# Patient Record
Sex: FEMALE
[De-identification: names, ages, dates, MRNs, and addresses within clinical notes are randomized per-mention and may not be internally consistent; named-entity substitution may affect disease eponyms.]

---

## 2017-12-04 ENCOUNTER — APPOINTMENT (OUTPATIENT)
Dept: OBGYN | Facility: CLINIC | Age: 52
End: 2017-12-04
Payer: COMMERCIAL

## 2017-12-04 VITALS
WEIGHT: 172 LBS | HEIGHT: 67 IN | SYSTOLIC BLOOD PRESSURE: 126 MMHG | BODY MASS INDEX: 27 KG/M2 | DIASTOLIC BLOOD PRESSURE: 76 MMHG

## 2017-12-04 DIAGNOSIS — F17.200 NICOTINE DEPENDENCE, UNSPECIFIED, UNCOMPLICATED: ICD-10-CM

## 2017-12-04 DIAGNOSIS — Z82.49 FAMILY HISTORY OF ISCHEMIC HEART DISEASE AND OTHER DISEASES OF THE CIRCULATORY SYSTEM: ICD-10-CM

## 2017-12-04 LAB
BILIRUB UR QL STRIP: NORMAL
COLLECTION METHOD: NORMAL
GLUCOSE UR-MCNC: NORMAL
HCG UR QL: 0.2 EU/DL
HEMOCCULT SP1 STL QL: NEGATIVE
HGB UR QL STRIP.AUTO: NORMAL
KETONES UR-MCNC: NORMAL
LEUKOCYTE ESTERASE UR QL STRIP: NORMAL
NITRITE UR QL STRIP: NORMAL
PH UR STRIP: 6
PROT UR STRIP-MCNC: NORMAL
SP GR UR STRIP: <=1.005

## 2017-12-04 PROCEDURE — 81003 URINALYSIS AUTO W/O SCOPE: CPT | Mod: QW

## 2017-12-04 PROCEDURE — 99396 PREV VISIT EST AGE 40-64: CPT

## 2017-12-04 PROCEDURE — 82270 OCCULT BLOOD FECES: CPT

## 2018-02-28 LAB — HPV HIGH+LOW RISK DNA PNL CVX: NOT DETECTED

## 2019-02-25 ENCOUNTER — APPOINTMENT (OUTPATIENT)
Dept: OBGYN | Facility: CLINIC | Age: 54
End: 2019-02-25

## 2019-04-25 ENCOUNTER — APPOINTMENT (OUTPATIENT)
Dept: OBGYN | Facility: CLINIC | Age: 54
End: 2019-04-25
Payer: COMMERCIAL

## 2019-04-25 VITALS
DIASTOLIC BLOOD PRESSURE: 74 MMHG | WEIGHT: 173 LBS | HEIGHT: 67 IN | BODY MASS INDEX: 27.15 KG/M2 | SYSTOLIC BLOOD PRESSURE: 126 MMHG

## 2019-04-25 DIAGNOSIS — Z12.11 ENCOUNTER FOR SCREENING FOR MALIGNANT NEOPLASM OF COLON: ICD-10-CM

## 2019-04-25 LAB — HEMOCCULT SP1 STL QL: NEGATIVE

## 2019-04-25 PROCEDURE — 99396 PREV VISIT EST AGE 40-64: CPT

## 2019-04-25 PROCEDURE — 82270 OCCULT BLOOD FECES: CPT

## 2019-04-25 RX ORDER — PRASTERONE 6.5 MG/1
6.5 INSERT VAGINAL
Qty: 90 | Refills: 3 | Status: COMPLETED | COMMUNITY
Start: 2017-12-04 | End: 2019-04-25

## 2019-04-25 NOTE — REVIEW OF SYSTEMS
[Incontinence] : incontinence [Sleep Disturbances] : sleep disturbances [Frequency] : frequency [Nl] : Integumentary [FreeTextEntry1] : STRESS INCONTINENCE

## 2019-04-25 NOTE — PHYSICAL EXAM
[Awake] : awake [Alert] : alert [Goiter] : goiter [Soft] : soft [Oriented x3] : oriented to person, place, and time [Normal] : uterus [Cystocele] : a cystocele [Rectocele] : a rectocele [No Bleeding] : there was no active vaginal bleeding [No Tenderness] : no rectal tenderness [Uterine Adnexae] : were not tender and not enlarged [CTAB] : CTAB [RRR, No Murmurs] : RRR, no murmurs [Acute Distress] : no acute distress [LAD] : no lymphadenopathy [Thyroid Nodule] : no thyroid nodule [Mass] : no breast mass [Nipple Discharge] : no nipple discharge [Axillary LAD] : no axillary lymphadenopathy [Tender] : non tender [Discharge] : no discharge

## 2019-04-25 NOTE — HISTORY OF PRESENT ILLNESS
[___ Year(s) Ago] : [unfilled] year(s) ago [Good] : being in good health [Healthy Diet] : a healthy diet [Regular Exercise] : regular exercise [Weight Concerns] : weight concens [___ Servings of Dairy/Day] : [unfilled] servings of dairy foods per day [3 or more times/wk] :  3 or more times per week [Last Mammogram ___] : Last Mammogram was [unfilled] [Annual Vaginal Sonography ___] : annual vaginal sonograph [unfilled] [Performed Within 5 Years] : lipid profile performed within the past five years [Performed Last Year] : glucose screening performed last year [Uncertain Timing] : uncertain timing of ~his/her~ last thyroid function test [Hypertension] : hypertension [High LDL] : high LDL cholesterol [Low HDL] : low HDL cholesterol [Tobacco Use] : tobacco use [FH Cardiovascular Disease] : family history of cardiovascular disease [Abnormal Cervical Cytology] : abnormal cervical cytology [Pregnancy History] : pregnancy history: [Total Preg ___] : [unfilled] [Full Term ___] : [unfilled] [AB Spont ___] : miscarriages: [unfilled] [HPV Vaccine NA Due to Age] : HPV vaccine not available to patient due to age [Hot Flashes] : hot flashes [Night Sweats] : night sweats [Dyspareunia] : dyspareunia [Definite:  ___ (Date)] : the last menstrual period was [unfilled] [Experiencing Menopausal Sxs] : experiencing menopausal symptoms [Monogamous] : is monogamous [Male ___] : [unfilled] male [Current] : metabolic screening reviewed and current [Last Pap ___] : Last cervical pap smear was [unfilled] [Last Bone Density ___] : Last bone density studies [unfilled] [Last Colonoscopy ___] : Last colonoscopy [unfilled] [Postmenopausal] : is postmenopausal [Sexually Active] : is sexually active [Monogamous (Male Partner)] : is monogamous with a male partner [Diabetes] : no diabetes [Stress] : no stress [Obesity] : no obesity [Illicit Drug Use] : no illicit drug use [Sedentary Lifestyle] : no sedentary lifestyle [Previous Breast Cancer] : no previous breast cancer [In Utero MARY ANN Exposure] : no diethylstilbestrol (MARY ANN) exposure in utero [HPV Positive] : no positive screening for human papilloma virus [Previous Colon Polyps] : no previous colon polyps [Inflammatory Bowel Disease] : no inflammatory bowel disease [de-identified] : work as exercise, walking  does 4-5 miles daily [de-identified] : July 2017 [Currently In Menopause] : not currently in menopause

## 2019-04-25 NOTE — COUNSELING
[Breast Self Exam] : breast self exam [Nutrition] : nutrition [Exercise] : exercise [Vitamins/Supplements] : vitamins/supplements [Smoking Cessation] : smoking cessation [FreeTextEntry2] : Encouraged pt to consume 1200 mg. of calcium daily, in foods, or in supplements.  If using supplements, correct use of same reviewed.  Advised pt to get her Vit D level checked, and to take at least 1000 iu of Vit D3 daily in the interim;  discussed importance of weight bearing and resistance exercise,  5 times weekly for 30 minutes, and  finally safety was discussed .  Call fro any postmenop bleeding

## 2019-04-29 LAB — HPV HIGH+LOW RISK DNA PNL CVX: NOT DETECTED

## 2019-05-01 LAB — CYTOLOGY CVX/VAG DOC THIN PREP: NORMAL

## 2021-04-09 ENCOUNTER — APPOINTMENT (OUTPATIENT)
Dept: OBGYN | Facility: CLINIC | Age: 56
End: 2021-04-09

## 2021-11-17 ENCOUNTER — APPOINTMENT (OUTPATIENT)
Dept: OBGYN | Facility: CLINIC | Age: 56
End: 2021-11-17
Payer: COMMERCIAL

## 2021-11-17 ENCOUNTER — NON-APPOINTMENT (OUTPATIENT)
Age: 56
End: 2021-11-17

## 2021-11-17 VITALS
SYSTOLIC BLOOD PRESSURE: 142 MMHG | DIASTOLIC BLOOD PRESSURE: 68 MMHG | HEIGHT: 67 IN | BODY MASS INDEX: 27.94 KG/M2 | WEIGHT: 178 LBS

## 2021-11-17 DIAGNOSIS — Z12.39 ENCOUNTER FOR OTHER SCREENING FOR MALIGNANT NEOPLASM OF BREAST: ICD-10-CM

## 2021-11-17 DIAGNOSIS — F17.210 NICOTINE DEPENDENCE, CIGARETTES, UNCOMPLICATED: ICD-10-CM

## 2021-11-17 DIAGNOSIS — R87.610 ATYPICAL SQUAMOUS CELLS OF UNDETERMINED SIGNIFICANCE ON CYTOLOGIC SMEAR OF CERVIX (ASC-US): ICD-10-CM

## 2021-11-17 DIAGNOSIS — N95.2 POSTMENOPAUSAL ATROPHIC VAGINITIS: ICD-10-CM

## 2021-11-17 DIAGNOSIS — Z12.11 ENCOUNTER FOR SCREENING FOR MALIGNANT NEOPLASM OF COLON: ICD-10-CM

## 2021-11-17 DIAGNOSIS — Z13.820 ENCOUNTER FOR SCREENING FOR OSTEOPOROSIS: ICD-10-CM

## 2021-11-17 DIAGNOSIS — R87.810 CERVICAL HIGH RISK HUMAN PAPILLOMAVIRUS (HPV) DNA TEST POSITIVE: ICD-10-CM

## 2021-11-17 PROCEDURE — 99396 PREV VISIT EST AGE 40-64: CPT

## 2021-11-17 NOTE — PHYSICAL EXAM
[Appropriately responsive] : appropriately responsive [Alert] : alert [No Acute Distress] : no acute distress [No Murmurs] : no murmurs [Soft] : soft [Non-tender] : non-tender [Non-distended] : non-distended [No HSM] : No HSM [No Lesions] : no lesions [No Mass] : no mass [Oriented x3] : oriented x3 [Examination Of The Breasts] : a normal appearance [No Masses] : no breast masses were palpable [Vulvar Atrophy] : vulvar atrophy [Labia Majora] : normal [Labia Minora] : normal [Normal] : normal [Uterine Adnexae] : normal [Normal rectal exam] : was normal [Occult Blood Positive] : was negative for occult blood analysis

## 2021-11-17 NOTE — HISTORY OF PRESENT ILLNESS
[Patient reported mammogram was normal] : Patient reported mammogram was normal [Patient reported PAP Smear was normal] : Patient reported PAP Smear was normal [FreeTextEntry1] : 56-year-old female presents to office for annual exam without gynecological complaints. PAP smear completed. Bone density, mammogram, and low dose chest CT prescriptions given. Discussed possible Cologuard for colon cancer screening pending insurance. Smoking cessation education provided and discussed with patient. \par  [Mammogramdate] : 09/2020 [PapSmeardate] : 04/2019 [Currently Active] : currently active [Men] : men [No] : No

## 2021-11-17 NOTE — PLAN
[FreeTextEntry1] : \par THE PHYSICAL EXAM PORTION OF THE VISIT WAS CHAPERONED BY\par SOLANGE SOTELO RN\par

## 2021-11-22 LAB
CYTOLOGY CVX/VAG DOC THIN PREP: ABNORMAL
HPV HIGH+LOW RISK DNA PNL CVX: NOT DETECTED

## 2022-11-23 ENCOUNTER — APPOINTMENT (OUTPATIENT)
Dept: OBGYN | Facility: CLINIC | Age: 57
End: 2022-11-23

## 2023-04-07 ENCOUNTER — NON-APPOINTMENT (OUTPATIENT)
Age: 58
End: 2023-04-07

## 2023-07-13 ENCOUNTER — APPOINTMENT (OUTPATIENT)
Dept: OBGYN | Facility: CLINIC | Age: 58
End: 2023-07-13
Payer: COMMERCIAL

## 2023-07-13 ENCOUNTER — NON-APPOINTMENT (OUTPATIENT)
Age: 58
End: 2023-07-13

## 2023-07-13 VITALS
WEIGHT: 194.6 LBS | BODY MASS INDEX: 30.54 KG/M2 | HEIGHT: 67 IN | SYSTOLIC BLOOD PRESSURE: 116 MMHG | DIASTOLIC BLOOD PRESSURE: 70 MMHG

## 2023-07-13 DIAGNOSIS — Z12.4 ENCOUNTER FOR SCREENING FOR MALIGNANT NEOPLASM OF CERVIX: ICD-10-CM

## 2023-07-13 DIAGNOSIS — Z87.39 PERSONAL HISTORY OF OTHER DISEASES OF THE MUSCULOSKELETAL SYSTEM AND CONNECTIVE TISSUE: ICD-10-CM

## 2023-07-13 DIAGNOSIS — Z01.419 ENCOUNTER FOR GYNECOLOGICAL EXAMINATION (GENERAL) (ROUTINE) W/OUT ABNORMAL FINDINGS: ICD-10-CM

## 2023-07-13 PROCEDURE — 99396 PREV VISIT EST AGE 40-64: CPT

## 2023-07-13 NOTE — HISTORY OF PRESENT ILLNESS
[N] : Patient reports normal menses [Vasectomy (partner)] : has a partner with a vasectomy [Y] : Positive pregnancy history [Currently Active] : currently active [Mammogramdate] : 06/24/22 [TextBox_19] : BR1 [PapSmeardate] : 11/17/21 [TextBox_31] : WNL [HPVDate] : 11/17/21 [TextBox_78] : NEG [LMPDate] : 03/2017 [PGxTotal] : 6 [[facility]] : 5 [[facility] Center[facility]Living] : 5 [PGHxABSpont] : 1 [FreeTextEntry1] : 03/2017

## 2023-07-13 NOTE — DISCUSSION/SUMMARY
[FreeTextEntry1] : -Pap completed today, will f/u results\par -Encouraged self breast exams, pt to complete screening mammo, rx provided by PCP\par -Encouraged pt to complete screening colonoscopy and annual skin screening with dermatology\par -FU annually or sooner if needed. All questions answered

## 2023-07-13 NOTE — PHYSICAL EXAM
[Chaperone Present] : A chaperone was present in the examining room during all aspects of the physical examination [FreeTextEntry1] : Monik HOFFMAN MA  [Appropriately responsive] : appropriately responsive [Alert] : alert [No Acute Distress] : no acute distress [Oriented x3] : oriented x3 [Examination Of The Breasts] : a normal appearance [No Masses] : no breast masses were palpable [Labia Majora] : normal [Labia Minora] : normal [Atrophy] : atrophy [Normal] : normal [Uterine Adnexae] : normal

## 2023-07-17 LAB — HPV HIGH+LOW RISK DNA PNL CVX: NOT DETECTED

## 2023-07-18 LAB — CYTOLOGY CVX/VAG DOC THIN PREP: ABNORMAL

## 2024-07-15 ENCOUNTER — APPOINTMENT (OUTPATIENT)
Dept: OBGYN | Facility: CLINIC | Age: 59
End: 2024-07-15

## 2024-09-17 ENCOUNTER — APPOINTMENT (OUTPATIENT)
Dept: ORTHOPEDIC SURGERY | Facility: CLINIC | Age: 59
End: 2024-09-17
Payer: COMMERCIAL

## 2024-09-17 VITALS — WEIGHT: 188 LBS | HEIGHT: 67 IN | BODY MASS INDEX: 29.51 KG/M2

## 2024-09-17 DIAGNOSIS — E11.9 TYPE 2 DIABETES MELLITUS W/OUT COMPLICATIONS: ICD-10-CM

## 2024-09-17 DIAGNOSIS — M79.641 PAIN IN RIGHT HAND: ICD-10-CM

## 2024-09-17 PROCEDURE — 73130 X-RAY EXAM OF HAND: CPT | Mod: 50

## 2024-09-17 PROCEDURE — 99204 OFFICE O/P NEW MOD 45 MIN: CPT

## 2024-09-17 NOTE — ASSESSMENT
[FreeTextEntry1] : Right hand mass - reviewed pathoanatomy with patient. Discussed that operative excision would be the definitive way to identify the pathology of the mass. Patient voiced interest in doing so. We will obtain right hand MRI without contrast to further detail characteristics of mass and follow-up thereafter to discuss results and develop plan of care. Discussed we can consider doing an arthrotomy and removing tissue from MP joint, but recurrence is likely.  F/u after MRI

## 2024-09-17 NOTE — HISTORY OF PRESENT ILLNESS
[de-identified] : 59F, RHD presents with bilateral hand pain R>L for approximately 2+ years, no specific cause of injury/trauma. Patient reports she has history of rheumatoid arthritis, treated by Dr. Loja who recommended occupational therapy in which she was referred by therapist to see a hand specialist. Admits to experiencing constant aching pain, worsens with use. Admits to numbness/ tingling as well.

## 2024-09-17 NOTE — IMAGING
[de-identified] : Right middle finger MCP swelling and index finger pulp mass, nontender. Soft, nontender. Able to fully flex and extend at MCP, PIP and DIP. Sensation intact at radial and ulnar aspects of pulp. <2sec cap refill.   Right hand radiographs with no fracture nor dislocation. Carpus aligned, minimal arthrosis. (3-view)   Left middle finger mild MCP swelling, nontender. Soft, nontender. Able to fully flex and extend at MCP, PIP and DIP. Sensation intact at radial and ulnar aspects of pulp. <2sec cap refill.   Left hand radiographs with no fracture nor dislocation. Carpus aligned, minimal arthrosis. (3-view)

## 2024-10-11 ENCOUNTER — APPOINTMENT (OUTPATIENT)
Dept: ORTHOPEDIC SURGERY | Facility: CLINIC | Age: 59
End: 2024-10-11
Payer: COMMERCIAL

## 2024-10-11 DIAGNOSIS — M79.641 PAIN IN RIGHT HAND: ICD-10-CM

## 2024-10-11 PROCEDURE — 99214 OFFICE O/P EST MOD 30 MIN: CPT
